# Patient Record
Sex: MALE | URBAN - METROPOLITAN AREA
[De-identification: names, ages, dates, MRNs, and addresses within clinical notes are randomized per-mention and may not be internally consistent; named-entity substitution may affect disease eponyms.]

---

## 2023-07-11 ENCOUNTER — APPOINTMENT (OUTPATIENT)
Dept: URBAN - METROPOLITAN AREA CLINIC 243 | Age: 53
Setting detail: DERMATOLOGY
End: 2023-07-11

## 2023-07-11 DIAGNOSIS — D49.2 NEOPLASM OF UNSPECIFIED BEHAVIOR OF BONE, SOFT TISSUE, AND SKIN: ICD-10-CM

## 2023-07-11 DIAGNOSIS — L91.8 OTHER HYPERTROPHIC DISORDERS OF THE SKIN: ICD-10-CM

## 2023-07-11 DIAGNOSIS — Z71.89 OTHER SPECIFIED COUNSELING: ICD-10-CM

## 2023-07-11 DIAGNOSIS — L82.1 OTHER SEBORRHEIC KERATOSIS: ICD-10-CM

## 2023-07-11 DIAGNOSIS — D22 MELANOCYTIC NEVI: ICD-10-CM

## 2023-07-11 DIAGNOSIS — L81.4 OTHER MELANIN HYPERPIGMENTATION: ICD-10-CM

## 2023-07-11 DIAGNOSIS — Z12.83 ENCOUNTER FOR SCREENING FOR MALIGNANT NEOPLASM OF SKIN: ICD-10-CM

## 2023-07-11 PROBLEM — D22.5 MELANOCYTIC NEVI OF TRUNK: Status: ACTIVE | Noted: 2023-07-11

## 2023-07-11 PROCEDURE — 11300 SHAVE SKIN LESION 0.5 CM/<: CPT

## 2023-07-11 PROCEDURE — OTHER COUNSELING: OTHER

## 2023-07-11 PROCEDURE — A4550 SURGICAL TRAYS: HCPCS

## 2023-07-11 PROCEDURE — OTHER SHAVE REMOVAL: OTHER

## 2023-07-11 PROCEDURE — OTHER MIPS QUALITY: OTHER

## 2023-07-11 PROCEDURE — 99203 OFFICE O/P NEW LOW 30 MIN: CPT | Mod: 25

## 2023-07-11 PROCEDURE — OTHER REASSURANCE: OTHER

## 2023-07-11 ASSESSMENT — LOCATION DETAILED DESCRIPTION DERM
LOCATION DETAILED: RIGHT INFERIOR MEDIAL UPPER BACK
LOCATION DETAILED: RIGHT INFERIOR MEDIAL LOWER BACK
LOCATION DETAILED: RIGHT INFERIOR MEDIAL SCROTUM
LOCATION DETAILED: RIGHT SUPERIOR MEDIAL LOWER BACK
LOCATION DETAILED: RIGHT ELBOW
LOCATION DETAILED: INFERIOR THORACIC SPINE
LOCATION DETAILED: RIGHT INFERIOR MEDIAL MIDBACK

## 2023-07-11 ASSESSMENT — LOCATION ZONE DERM
LOCATION ZONE: SCROTUM
LOCATION ZONE: ARM
LOCATION ZONE: TRUNK

## 2023-07-11 ASSESSMENT — LOCATION SIMPLE DESCRIPTION DERM
LOCATION SIMPLE: RIGHT UPPER BACK
LOCATION SIMPLE: RIGHT SCROTUM
LOCATION SIMPLE: UPPER BACK
LOCATION SIMPLE: RIGHT ELBOW
LOCATION SIMPLE: RIGHT LOWER BACK

## 2023-07-11 NOTE — PROCEDURE: REASSURANCE
Sunscreen Recommendations: Mineral Based Sun Protection with Zinc Oxide or Titanium Dioxide as the ONLY active ingredients
Detail Level: Detailed

## 2025-01-20 NOTE — PROCEDURE: SHAVE REMOVAL
Anne Marie Nguyen  is requesting a refill of:    ketoconazole (NIZORAL) 2 % cream  Last filled: 01/15/2025  Number of refills: 1    triamcinolone (KENALOG) 0.5 % cream  Last filled: 01/15/2025  Number of refills: 1    Next office visit: 02/27/2025 ([facility])  Last office visit: 01/15/2025  F/u recommended: not documented    01/15/2025 Office visit notes:   ASSESSMENT AND PLAN:     1. Screening exam for skin cancer     2. Multiple melanocytic nevi  Multiple and uniformly benign appearing today. Reassured that all are benign appearing on exam today. Recommended sun avoidance/protection (including OTC sunscreen use) and regular self-exams, reviewed warning signs of skin cancer and ABCDE's of melanoma. Patient will inform us of any changing or concerning lesions.       3. SK (seborrheic keratosis)  Discussed benign nature of lesions, reassurance provided.  No need for further treatment.     4. Cherry angioma  Benign diagnosis discussed and reassurance provided.       5. Lentigines  The nature of sun-induced photo-aging and skin cancers is discussed. Recommended sun avoidance/protection (including OTC sunscreen use) and regular self-exams, reviewed warning signs of skin cancer. Patient will inform us of any changing or concerning lesions.     6. History of nonmelanoma skin cancer  There is no concern for recurrence or additional occurrence on exam.  Encouraged/reviewed good sun protective measures including SPF 30+ sun screen, sun-protective clothing, sun avoidance during peak sun intensity and broad-brimmed hats.     7. EIC (epidermal inclusion cyst)  Discussed benign diagnosis and possibility of becoming inflamed or infected. Discussed that treatment would require surgical excision to remove entire wall of cyst to prevent recurrence.      8. Intertrigo  Chronic, well controlled. Continue ketoconazole 2% cream BID PRN and TAC 0.5% cream BID PRN. Refills provided.       Not Refilled - Dr. Larkin filled and sent to pharmacy  on 01/15/2025. Patient notified.  Pharmacy verified and medication pending for provider approval. Please review, adjust and sign. Thank you       Path Notes (To The Dermatopathologist): Check margins